# Patient Record
Sex: MALE
[De-identification: names, ages, dates, MRNs, and addresses within clinical notes are randomized per-mention and may not be internally consistent; named-entity substitution may affect disease eponyms.]

---

## 2019-12-07 ENCOUNTER — HOSPITAL ENCOUNTER (EMERGENCY)
Dept: HOSPITAL 97 - ER | Age: 29
Discharge: HOME | End: 2019-12-07
Payer: SELF-PAY

## 2019-12-07 VITALS — TEMPERATURE: 97.6 F

## 2019-12-07 VITALS — DIASTOLIC BLOOD PRESSURE: 81 MMHG | SYSTOLIC BLOOD PRESSURE: 126 MMHG | OXYGEN SATURATION: 100 %

## 2019-12-07 DIAGNOSIS — H57.13: ICD-10-CM

## 2019-12-07 DIAGNOSIS — H10.9: Primary | ICD-10-CM

## 2019-12-07 PROCEDURE — 82947 ASSAY GLUCOSE BLOOD QUANT: CPT

## 2019-12-07 PROCEDURE — 99283 EMERGENCY DEPT VISIT LOW MDM: CPT

## 2019-12-07 NOTE — ER
Nurse's Notes                                                                                     

 [facility]                                                                 

Name: Sudhir Marroquin                                                                               

Age: 29 yrs                                                                                       

Sex: Male                                                                                         

: 1990                                                                                   

MRN: [id number]                                                                                   

Arrival Date: 2019                                                                          

Time: 04:46                                                                                       

Account#: [id number]                                                                            

Bed 7                                                                                             

Private MD:                                                                                       

Diagnosis: Conjunctivitis;Ocular pain, left eye;Ocular pain, right eye                            

                                                                                                  

Presentation:                                                                                     

                                                                                             

04:57 Presenting complaint: Patient states: "since about  I have been having      jd3 

      irritating bloodshot eyes. and here recently when I wake up I have been having that         

      sleep goo in my eyes to point I can't open them without removing that stuff first.".        

      Transition of care: patient was not received from another setting of care. Mechanism of     

      Injury: No Mechanism of Injury. The patient denies any loss of vision. Onset of             

      symptoms was 2019. Risk Assessment: Do you want to hurt yourself or            

      someone else? Patient reports no desire to harm self or others. Initial Sepsis Screen:      

      Does the patient meet any 2 criteria? No. Patient's initial sepsis screen is negative.      

      Does the patient have a suspected source of infection? No. Patient's initial sepsis         

      screen is negative. Care prior to arrival: None.                                            

04:57 Method Of Arrival: Ambulatory                                                           jd3 

04:57 Acuity: STELLA 5                                                                           jd3 

                                                                                                  

Historical:                                                                                       

- Allergies:                                                                                      

05:00 No Known Allergies;                                                                     jd3 

- Home Meds:                                                                                      

05:00 None [Active];                                                                          jd3 

- PMHx:                                                                                           

05:00 Asthma; Arthritis; Kidney stones;                                                       jd3 

- PSHx:                                                                                           

05:00 None;                                                                                   jd3 

                                                                                                  

- Immunization history:: Adult Immunizations up to date.                                          

- Social history:: Smoking status: Patient/guardian denies using tobacco.                         

- Ebola Screening: : Patient negative for fever greater than or equal to 101.5 degrees            

  Fahrenheit, and additional compatible Ebola Virus Disease symptoms.                             

- Family history:: not pertinent.                                                                 

                                                                                                  

                                                                                                  

Screenin:03 Abuse screen: Denies threats or abuse. Nutritional screening: No deficits noted.        jd3 

      Tuberculosis screening: No symptoms or risk factors identified. Fall Risk Ambulatory        

      Aid- None/Bed Rest/Nurse Assist (0 pts). Gait- Normal/Bed Rest/Wheelchair (0 pts)           

      Mental Status- Oriented to own ability (0 pts). Total Bland Fall Scale indicates No         

      Risk (0-24 pts).                                                                            

                                                                                                  

Assessment:                                                                                       

05:02 General: Appears in no apparent distress. uncomfortable, Behavior is calm, cooperative, jd3 

      appropriate for age. Pain: Complains of pain in right eye and left eye Quality of pain      

      is described as pressure. Neuro: Level of Consciousness is awake, alert, obeys              

      commands, Oriented to person, place, time, situation. Cardiovascular: Capillary refill      

      < 3 seconds Patient's skin is warm and dry. Respiratory: Airway is patent Respiratory       

      effort is even, unlabored, Respiratory pattern is regular, symmetrical. GI: No signs        

      and/or symptoms were reported involving the gastrointestinal system. : No signs           

      and/or symptoms were reported regarding the genitourinary system. EENT: Eyes no foreign     

      object noted. Sclera/Cornea are reddened in right eye and left eye. Derm: Skin is           

      intact, Skin is dry, Skin is normal, Skin temperature is warm. Musculoskeletal:             

      Circulation, motion, and sensation intact. Range of motion: intact in all extremities.      

06:41 Reassessment: Patient appears in no apparent distress at this time. Patient and/or      jd3 

      family updated on plan of care and expected duration. Pain level reassessed. Patient is     

      alert, oriented x 3, equal unlabored respirations, skin warm/dry/pink. Patient states       

      feeling better.                                                                             

                                                                                                  

Vital Signs:                                                                                      

05:00  / 90; Pulse 72; Resp 16 S; Temp 97.6(TE); Pulse Ox 98% on R/A; Weight 90.72 kg   jd3 

      (R); Height 5 ft. 7 in. (170.18 cm) (R); Pain 4/10;                                         

06:41  / 81; Pulse 71; Resp 18 S; Pulse Ox 100% on R/A;                                 jd3 

05:00 Body Mass Index 31.32 (90.72 kg, 170.18 cm)                                             jd3 

                                                                                                  

Visual Acuity:                                                                                    

05:01 Left Eye Visual acuity 20/20, Pupil size 3 mm, Normal, React To Light, Reactive To      jd3 

      Accomodation; Right Eye Visual acuity 20/25, Pupil size 3 mm, Normal, React To Light,       

      Reactive To Accomodation; Both Eyes Visual acuity 20/20; Without Lenses;                    

                                                                                                  

ED Course:                                                                                        

04:46 Patient arrived in ED.                                                                  cl3 

04:57 Rocky Arrington RN is Primary Nurse.                                                  jd3 

04:59 Triage completed.                                                                       jd3 

05:01 Arm band placed on.                                                                     jd3 

05:03 Patient has correct armband on for positive identification. Bed in low position. Call   jd3 

      light in reach. Side rails up X 1. Adult w/ patient.                                        

05:20 Eben Coy MD is Attending Physician.                                             benny 

05:48 Segundo Espinoza MD is Referral Physician.                                              [facility] 

06:41 No provider procedures requiring assistance completed. Patient did not have IV access   jd3 

      during this emergency room visit.                                                           

                                                                                                  

Administered Medications:                                                                         

06:25 Drug: Tetracaine Drops 0.5 % 1 drops {Note: given by Dr. Coy.} Route: Ophthalmic;  jd3 

      Site: both eyes;                                                                            

06:45 Follow up: Response: Medication administered at discharge.                              jd3 

06:36 Drug: Tobrex 0.3 % 1 application Route: Ophthalmic; Site: both eyes;                    jd3 

06:43 Follow up: Response: Medication administered at discharge.                              jd3 

                                                                                                  

                                                                                                  

Outcome:                                                                                          

05:50 Discharge ordered by MD.                                                                benny 

06:41 Discharged to home ambulatory, with family.                                             jd3 

06:41 Condition: stable                                                                           

06:41 Discharge instructions given to patient, family, Instructed on discharge instructions,      

      follow up and referral plans. medication usage, Demonstrated understanding of               

      instructions, follow-up care, medications, Prescriptions given X 3.                         

06:45 Patient left the ED.                                                                    jd3 

                                                                                                  

Signatures:                                                                                       

Eben Coy MD MD cha Davies, Jonathon, RN                    RN   Aan Manriquez                                cl3                                                  

                                                                                                  

**************************************************************************************************

## 2019-12-07 NOTE — EDPHYS
Physician Documentation                                                                           

 [facility]                                                                 

Name: Sudhir Marroquin                                                                               

Age: 29 yrs                                                                                       

Sex: Male                                                                                         

: 1990                                                                                   

MRN: [id number]                                                                                   

Arrival Date: 2019                                                                          

Time: 04:46                                                                                       

Account#: [id number]                                                                            

Bed 7                                                                                             

Private MD:                                                                                       

ED Physician Eben Coy                                                                      

HPI:                                                                                              

                                                                                             

05:43 This 29 yrs old  Male presents to ER via Ambulatory with complaints of Eye      benny 

      Pain, Eye Problem.                                                                          

05:43 The patient is experiencing burning, matting or discharge, pain, to both eyes. Onset:   benny 

      The symptoms/episode began/occurred 30 day(s) ago. Duration: the symptoms are               

      continuous. Aggravated by blinking, closing eye, opening eye. Associated signs and          

      symptoms: Pertinent positives: None. Pertinent negatives: None. Patient does not            

      utilize any form of vision correction. Severity of symptoms: At their worst the             

      symptoms were.                                                                              

                                                                                                  

Historical:                                                                                       

- Allergies:                                                                                      

05:00 No Known Allergies;                                                                     jd3 

- Home Meds:                                                                                      

05:00 None [Active];                                                                          jd3 

- PMHx:                                                                                           

05:00 Asthma; Arthritis; Kidney stones;                                                       jd3 

- PSHx:                                                                                           

05:00 None;                                                                                   jd3 

                                                                                                  

- Immunization history:: Adult Immunizations up to date.                                          

- Social history:: Smoking status: Patient/guardian denies using tobacco.                         

- Ebola Screening: : Patient negative for fever greater than or equal to 101.5 degrees            

  Fahrenheit, and additional compatible Ebola Virus Disease symptoms.                             

- Family history:: not pertinent.                                                                 

                                                                                                  

                                                                                                  

ROS:                                                                                              

05:43 Constitutional: Negative for fever, chills, and weight loss, ENT: Negative for injury,  benny 

      pain, and discharge, Neck: Negative for injury, pain, and swelling, Cardiovascular:         

      Negative for chest pain, palpitations, and edema, Respiratory: Negative for shortness       

      of breath, cough, wheezing, and pleuritic chest pain, Abdomen/GI: Negative for              

      abdominal pain, nausea, vomiting, diarrhea, and constipation, Back: Negative for injury     

      and pain, : Negative for injury, bleeding, discharge, and swelling, MS/Extremity:         

      Negative for injury and deformity, Skin: Negative for injury, rash, and discoloration,      

      Neuro: Negative for headache, weakness, numbness, tingling, and seizure, Psych:             

      Negative for depression, anxiety, suicide ideation, homicidal ideation, and                 

      hallucinations, Allergy/Immunology: Negative for hives, rash, and allergies, Endocrine:     

      Negative for neck swelling, polydipsia, polyuria, polyphagia, and marked weight             

      changes, Hematologic/Lymphatic: Negative for swollen nodes, abnormal bleeding, and          

      unusual bruising.                                                                           

05:43 Eyes: Positive for blurry vision, itching, matting, pain, redness, of the outer aspect      

      of conjuctiva of right eye, iris of right eye, inner aspect of conjuctiva of right eye,     

      outer aspect of conjuctiva of left eye, iris of left eye and inner aspect of                

      conjunctiva of left eye.                                                                    

                                                                                                  

Exam:                                                                                             

05:43 Constitutional:  This is a well developed, well nourished patient who is awake, alert,  benny 

      and in no acute distress. Head/Face:  Normocephalic, atraumatic. ENT:  Nares patent. No     

      nasal discharge, no septal abnormalities noted.  Tympanic membranes are normal and          

      external auditory canals are clear.  Oropharynx with no redness, swelling, or masses,       

      exudates, or evidence of obstruction, uvula midline.  Mucous membranes moist. Neck:         

      Trachea midline, no thyromegaly or masses palpated, and no cervical lymphadenopathy.        

      Supple, full range of motion without nuchal rigidity, or vertebral point tenderness.        

      No Meningismus. Chest/axilla:  Normal chest wall appearance and motion.  Nontender with     

      no deformity.  No lesions are appreciated. Cardiovascular:  Regular rate and rhythm         

      with a normal S1 and S2.  No gallops, murmurs, or rubs.  Normal PMI, no JVD.  No pulse      

      deficits. Respiratory:  Lungs have equal breath sounds bilaterally, clear to                

      auscultation and percussion.  No rales, rhonchi or wheezes noted.  No increased work of     

      breathing, no retractions or nasal flaring. Abdomen/GI:  Soft, non-tender, with normal      

      bowel sounds.  No distension or tympany.  No guarding or rebound.  No evidence of           

      tenderness throughout. Back:  No spinal tenderness.  No costovertebral tenderness.          

      Full range of motion. Male :  Normal genitalia with no discharge or lesions. Skin:        

      Warm, dry with normal turgor.  Normal color with no rashes, no lesions, and no evidence     

      of cellulitis. MS/ Extremity:  Pulses equal, no cyanosis.  Neurovascular intact.  Full,     

      normal range of motion. Neuro:  Awake and alert, GCS 15, oriented to person, place,         

      time, and situation.  Cranial nerves II-XII grossly intact.  Motor strength 5/5 in all      

      extremities.  Sensory grossly intact.  Cerebellar exam normal.  Normal gait. Psych:         

      Awake, alert, with orientation to person, place and time.  Behavior, mood, and affect       

      are within normal limits.                                                                   

05:43 Eyes: Pupils: no acute changes, equal, round, and reactive to light and accomodation,       

      Extraocular movements: intact throughout, Conjunctiva: Corneas: are normal.                 

                                                                                                  

Vital Signs:                                                                                      

05:00  / 90; Pulse 72; Resp 16 S; Temp 97.6(TE); Pulse Ox 98% on R/A; Weight 90.72 kg   jd3 

      (R); Height 5 ft. 7 in. (170.18 cm) (R); Pain 4/10;                                         

06:41  / 81; Pulse 71; Resp 18 S; Pulse Ox 100% on R/A;                                 jd3 

05:00 Body Mass Index 31.32 (90.72 kg, 170.18 cm)                                             jd3 

                                                                                                  

Visual Acuity:                                                                                    

05:01 Left Eye Visual acuity 20/20, Pupil size 3 mm, Normal, React To Light, Reactive To      jd3 

      Accomodation; Right Eye Visual acuity 20/25, Pupil size 3 mm, Normal, React To Light,       

      Reactive To Accomodation; Both Eyes Visual acuity 20/20; Without Lenses;                    

                                                                                                  

MDM:                                                                                              

05:20 Patient medically screened.                                                             [facility] 

05:46 Data reviewed: vital signs, nurses notes.                                               [facility] 

                                                                                                  

                                                                                             

05:53 Order name: Glucose, Ancillary Testing                                                  EDMS

                                                                                             

05:48 Order name: Visual Acuity; Complete Time: 05:49                                         [facility] 

                                                                                             

05:48 Order name: Eye Tray; Complete Time: 05:49                                              [facility] 

                                                                                             

05:48 Order name: Fluoresene Opth strip; Complete Time: 05:49                                 [facility] 

                                                                                             

05:49 Order name: Accucheck; Complete Time: 05:49                                             lp1 

                                                                                                  

Administered Medications:                                                                         

06:25 Drug: Tetracaine Drops 0.5 % 1 drops {Note: given by Dr. Coy.} Route: Ophthalmic;  jd3 

      Site: both eyes;                                                                            

06:45 Follow up: Response: Medication administered at discharge.                              j 

06:36 Drug: Tobrex 0.3 % 1 application Route: Ophthalmic; Site: both eyes;                    jd3 

06:43 Follow up: Response: Medication administered at discharge.                              j 

                                                                                                  

                                                                                                  

Disposition:                                                                                      

19 05:50 Discharged to Home. Impression: Conjunctivitis, Ocular pain, left eye, Ocular      

  pain, right eye.                                                                                

- Condition is Stable.                                                                            

- Discharge Instructions: Bacterial Conjunctivitis.                                               

- Prescriptions for Tobrex 0.3 % Ophthalmic ointment - apply 1 inch ribbon by                     

  OPHTHALMIC route 3 times per day; 3.5 gram. Tylenol- Codeine #3 300-30 mg Oral Tablet           

  - take 2 tablet by ORAL route every 6 hours As needed; 30 tablet. Claritin 10 mg Oral           

  Tablet - take 1 tablet by ORAL route once daily As needed; 30 tablet.                           

- Medication Reconciliation Form, Thank You Letter, Antibiotic Education, Prescription            

  Opioid Use form.                                                                                

- Follow up: Private Physician; When: 1 - 2 days; Reason: Recheck today's complaints,             

  Re-evaluation by your physician. Follow up: Segundo Espinoza MD; When: 2 - 3 days;              

  Reason: Recheck today's complaints, Re-evaluation by your physician.                            

- Problem is new.                                                                                 

- Symptoms have improved.                                                                         

                                                                                                  

                                                                                                  

                                                                                                  

Signatures:                                                                                       

Dispatcher [employer]                           EDMS                                                 

Eben Coy MD MD cha Pena, Laura RN                         RN   lp1                                                  

Rocky Arrington RN                    RN   jd3                                                  

                                                                                                  

Corrections: (The following items were deleted from the chart)                                    

05:52 05:50 2019 05:50 Discharged to Home. Impression: Conjunctivitis. Condition is     benny 

      Stable. Forms are Medication Reconciliation Form, Thank You Letter, Antibiotic              

      Education, Prescription Opioid Use. Follow up: Private Physician; When: 1 - 2 days;         

      Reason: Recheck today's complaints, Re-evaluation by your physician. Follow up:             

      Segundo Espinoza; When: 2 - 3 days; Reason: Recheck today's complaints, Re-evaluation by      

      your physician. Problem is new. Symptoms have improved. [facility]                                 

06:45 05:52 2019 05:50 Discharged to Home. Impression: Conjunctivitis; Ocular pain,     jd3 

      left eye; Ocular pain, right eye. Condition is Stable. Discharge Instructions:              

      Bacterial Conjunctivitis. Prescriptions for Tobrex 0.3 % Ophthalmic ointment - apply 1      

      inch ribbon by OPHTHALMIC route 3 times per day; 3.5 gram, Tylenol-Codeine #3 300-30 mg     

      Oral Tablet - take 2 tablet by ORAL route every 6 hours As needed; 30 tablet, Claritin      

      10 mg Oral Tablet - take 1 tablet by ORAL route once daily As needed; 30 tablet. and        

      Forms are Medication Reconciliation Form, Thank You Letter, Antibiotic Education,           

      Prescription Opioid Use. Follow up: Private Physician; When: 1 - 2 days; Reason:            

      Recheck today's complaints, Re-evaluation by your physician. Follow up: Segundo Espinoza;     

      When: 2 - 3 days; Reason: Recheck today's complaints, Re-evaluation by your physician.      

      Problem is new. Symptoms have improved. benny                                                 

                                                                                                  

**************************************************************************************************